# Patient Record
Sex: FEMALE | Race: WHITE | NOT HISPANIC OR LATINO | Employment: FULL TIME | URBAN - METROPOLITAN AREA
[De-identification: names, ages, dates, MRNs, and addresses within clinical notes are randomized per-mention and may not be internally consistent; named-entity substitution may affect disease eponyms.]

---

## 2018-05-15 ENCOUNTER — CONVERSION ENCOUNTER (OUTPATIENT)
Dept: MAMMOGRAPHY | Facility: HOSPITAL | Age: 38
End: 2018-05-15

## 2019-10-10 ENCOUNTER — HOSPITAL ENCOUNTER (OUTPATIENT)
Dept: PERIOP | Facility: HOSPITAL | Age: 39
Setting detail: HOSPITAL OUTPATIENT SURGERY
Discharge: HOME | End: 2019-10-10
Attending: OBSTETRICS & GYNECOLOGY

## 2019-10-10 LAB
ABO GROUP BLD: NORMAL
BASOPHILS # BLD AUTO: 0.03 10*3/UL (ref 0–0.2)
BASOPHILS NFR BLD AUTO: 0.5 % (ref 0–3)
BLD GP AB SCN SERPL QL: NORMAL
CONV ABD CONTROL: NORMAL
CONV ABS IMM GRAN: 0.01 10*3/UL (ref 0–0.2)
CONV IMMATURE GRAN: 0.2 % (ref 0–1.8)
DEPRECATED RDW RBC AUTO: 43.4 FL (ref 36.4–46.3)
EOSINOPHIL # BLD AUTO: 0.17 10*3/UL (ref 0–0.7)
EOSINOPHIL # BLD AUTO: 3 % (ref 0–7)
ERYTHROCYTE [DISTWIDTH] IN BLOOD BY AUTOMATED COUNT: 12.6 % (ref 11.7–14.4)
HCG UR QL: NEGATIVE
HCT VFR BLD AUTO: 41.8 % (ref 37–47)
HGB BLD-MCNC: 13.8 G/DL (ref 12–16)
LYMPHOCYTES # BLD AUTO: 2.45 10*3/UL (ref 1–5)
LYMPHOCYTES NFR BLD AUTO: 42.9 % (ref 20–45)
MCH RBC QN AUTO: 30.6 PG (ref 27–31)
MCHC RBC AUTO-ENTMCNC: 33 G/DL (ref 33–37)
MCV RBC AUTO: 92.7 FL (ref 81–99)
MONOCYTES # BLD AUTO: 0.85 10*3/UL (ref 0.2–1.2)
MONOCYTES NFR BLD AUTO: 14.9 % (ref 3–10)
NEUTROPHILS # BLD AUTO: 2.2 10*3/UL (ref 2–8)
NEUTROPHILS NFR BLD AUTO: 38.5 % (ref 30–85)
NRBC CBCN: 0 % (ref 0–0.7)
PLATELET # BLD AUTO: 214 10*3/UL (ref 130–400)
PMV BLD AUTO: 11.2 FL (ref 9.4–12.3)
RBC # BLD AUTO: 4.51 10*6/UL (ref 4.2–5.4)
RH BLD: NORMAL
WBC # BLD AUTO: 5.71 10*3/UL (ref 4.8–10.8)

## 2021-03-05 ENCOUNTER — OFFICE VISIT CONVERTED (OUTPATIENT)
Dept: SURGERY | Facility: CLINIC | Age: 41
End: 2021-03-05
Attending: SURGERY

## 2021-03-05 ENCOUNTER — HOSPITAL ENCOUNTER (OUTPATIENT)
Dept: PREADMISSION TESTING | Facility: HOSPITAL | Age: 41
Discharge: HOME | End: 2021-03-05
Attending: SURGERY

## 2021-03-06 LAB — SARS-COV-2 RNA SPEC QL NAA+PROBE: NOT DETECTED

## 2021-03-09 ENCOUNTER — HOSPITAL ENCOUNTER (OUTPATIENT)
Dept: PERIOP | Facility: HOSPITAL | Age: 41
Setting detail: HOSPITAL OUTPATIENT SURGERY
Discharge: HOME | End: 2021-03-09
Attending: SURGERY

## 2021-03-19 ENCOUNTER — OFFICE VISIT CONVERTED (OUTPATIENT)
Dept: SURGERY | Facility: CLINIC | Age: 41
End: 2021-03-19
Attending: SURGERY

## 2021-03-19 ENCOUNTER — CONVERSION ENCOUNTER (OUTPATIENT)
Dept: SURGERY | Facility: CLINIC | Age: 41
End: 2021-03-19

## 2021-04-09 ENCOUNTER — HOSPITAL ENCOUNTER (OUTPATIENT)
Dept: PREADMISSION TESTING | Facility: HOSPITAL | Age: 41
Discharge: HOME | End: 2021-04-09
Attending: SURGERY

## 2021-04-09 LAB — SARS-COV-2 RNA SPEC QL NAA+PROBE: NOT DETECTED

## 2021-04-14 ENCOUNTER — HOSPITAL ENCOUNTER (OUTPATIENT)
Dept: GASTROENTEROLOGY | Facility: HOSPITAL | Age: 41
Setting detail: HOSPITAL OUTPATIENT SURGERY
Discharge: HOME | End: 2021-04-14
Attending: SURGERY

## 2021-05-10 NOTE — H&P
History and Physical      Patient Name: Jeimy Bautista   Patient ID: [id number]   Sex: Female   YOB: 1980    Referring Provider: Tristian Cantu MD    Visit Date: 2021    Provider: Regan Longoria MD   Location: [facility]   Location Address: [address] [place] [address]  [id number]   Location Phone: [phone]          Chief Complaint  · Outpatient History & Physical / Surgical Orders  · Gallbladder Consult      History Of Present Illness  Jeimy Bautista is a 40 year old /White female who presents to the office today as a consult from Tristian Cantu MD.      atient was referred to me for consideration for gallbladder removal.  The patient has right upper quadrant pain that occurs after eating, especially after eating fatty and greasy foods.  The symptoms have been occurring on and off for the past 10 years and have been worsening quite a bit over the past year.  She was evaluated with an abdominal ultrasound at [facility] on 2020 and findings were unremarkable except for the presence of gallbladder sludge.  Also, the patient says she has been having lots of GERD and reflux.  She occasionally takes ibuprofen for shoulder arthritis.  She says Dr. Cantu was hoping an EGD could be done at the same time her gallbladder is removed if I decide to recommend gallbladder removal.  Patient takes Protonix 40 mg 2 times daily.  Surgical history includes  x3 with transversely oriented incisions and also laparoscopic tubal ligation.       Past Medical History  Disease Name Date Onset Notes   Allergic rhinitis, chronic --  --          Past Surgical History  Procedure Name Date Notes   *Metal Implant --  --    Hernia --  --          Medication List  Name Date Started Instructions   clonazepam 1 mg oral tablet  take 1 tablet (1 mg) by oral route 2 times per day   Depakote 500 mg oral tablet,delayed release (DR/EC)  take 2  "tablets (1,000 mg) by oral route 2 times per day   Flonase Allergy Relief 50 mcg/actuation nasal spray,suspension  spray 2 sprays (100 mcg) in each nostril by intranasal route once daily as needed   Protonix 40 mg oral tablet,delayed release (DR/EC)  take 2 tablets (80 mg) by oral route 2 times per day   trazodone 150 mg oral tablet  take 1 tablet (150 mg) by oral route 2 times per day         Allergy List  Allergen Name Date Reaction Notes   NO KNOWN DRUG ALLERGIES --  --  --        Allergies Reconciled  Family Medical History  Disease Name Relative/Age Notes   Diabetes, unspecified type Mother/   Mother   Family history of breast cancer Grandmother (paternal)/60s   Aunt/60s; Grandmother (paternal)/60s   Bladder calculus Mother/   Mother         Review of Systems  · Constitutional  o Denies  o : chills, fever  · Gastrointestinal  o Denies  o : vomiting      Vitals  Date Time BP Position Site L\R Cuff Size HR RR TEMP (F) WT  HT  BMI kg/m2 BSA m2 O2 Sat FR L/min FiO2        03/05/2021 01:49 PM       12  210lbs 4oz 5'  2\" 38.45 2.04             Physical Examination  · Constitutional  o Appearance  o : healthy appearing, alert and in no acute distress, reliable historian, here alone  · Head and Face  o Head  o :   § Inspection  § : no visable deformities or lesions  · Eyes  o Conjunctivae  o : clear  o Sclerae  o : clear  · Neck  o Inspection/Palpation  o : normal appearance, no masses, trachea midline  · Respiratory  o Respiratory Effort  o : breathing unlabored, respiratory effort appears normal  o Inspection of Chest  o : normal appearance, no retractions  · Cardiovascular  o Heart  o : regular rate and rhythm  · Gastrointestinal  o Abdominal Examination  o :   § Abdomen  § : soft, nontender, nondistended  · Skin and Subcutaneous Tissue  o General Inspection  o : no visible concerning rashes or lesions present  · Neurologic  o Cranial Nerves  o : no obvious motor deficits  o Sensation  o : no obvious sensory " deficits  o Gait and Station  o :   § Gait Screening  § : normal gait, able to stand without diffculty  o Cerebellar Function  o : no obvious abnormalities  · Psychiatric  o Judgement and Insight  o : judgment and insight intact  o Mood and Affect  o : mood normal, affect appropriate          Assessment  · Pre-Surgical Orders     V72.84  · Abdominal Pain, RUQ     789.01/R10.11  · Preop testing     V72.84/Z01.818  · GERD (gastroesophageal reflux disease)     530.81/K21.9  · Heartburn     787.1/R12  · Gallbladder sludge     575.8/K82.8      Plan  · Orders  o GENERAL SURGERY (GNSUR) - V72.84 - 03/09/2021  o [facility] [place]Op Covid-19 Screening (43377) - V72.84/Z01.818 - 03/05/2021  · Medications  o Medications have been Reconciled  o Transition of Care or Provider Policy  · Instructions  o PLAN: Laparoscopic Cholecystectomy with Intraoperative Cholangiogram and EGD  o PLEASE SIGN PERMIT FOR: Laparoscopic Cholecystectomy with Intraoperative Cholangiogram and esophagogastroduodenoscopy  o Anesthesia: General   o Outpatient  o O.R. PREP: Per protocol  o IV: Per Anesthesia  o SCD's preoperatively  o No antibiotic is needed.  o The indications, options, risks, benefits, and expected outcomes of the planned procedure were discussed with the patient and the patient agrees to proceed.   o Electronically Identified Patient Education Materials Provided Electronically            Electronically Signed by: Regan Lnogoria MD -Author on March 5, 2021 02:02:59 PM

## 2021-05-14 VITALS — BODY MASS INDEX: 38.69 KG/M2 | HEIGHT: 62 IN | RESPIRATION RATE: 12 BRPM | WEIGHT: 210.25 LBS

## 2021-05-14 VITALS — RESPIRATION RATE: 14 BRPM | WEIGHT: 216 LBS | HEIGHT: 62 IN | BODY MASS INDEX: 39.75 KG/M2

## 2021-05-14 NOTE — PROGRESS NOTES
"   Progress Note      Patient Name: Jeimy Bautista   Patient ID: [id number]   Sex: Female   YOB: 1980    Primary Care Provider: Tristian Cantu MD   Referring Provider: Tristian Cantu MD    Visit Date: March 19, 2021    Provider: Regan Longoria MD   Location: [facility]   Location Address: [address] [place] [address]  [id number]   Location Phone: [phone]          Chief Complaint  · Follow up Surgery      History Of Present Illness  Jeimy Bautista is a 40 year old /White female who presents today for a postoperative visit.      Patient is here for follow-up after gallbladder removal.  She is doing great.  No complaints.  Incisions are healing well.  Abdominal exam is benign.  No new issues to address.  Patient can see me as needed       Past Medical History  Allergic rhinitis, chronic         Past Surgical History  *Metal Implant; Cholecystectomy; Hernia         Medication List  clonazepam 1 mg oral tablet; Depakote 500 mg oral tablet,delayed release (DR/EC); Flonase Allergy Relief 50 mcg/actuation nasal spray,suspension; Protonix 40 mg oral tablet,delayed release (DR/EC); trazodone 150 mg oral tablet         Allergy List  NO KNOWN DRUG ALLERGIES         Family Medical History  Diabetes, unspecified type; Family history of breast cancer; Bladder calculus         Social History  Tobacco (Former)         Review of Systems  · Gastrointestinal  o Denies  o : nausea, vomiting, diarrhea, constipation, flank pain      Vitals  Date Time BP Position Site L\R Cuff Size HR RR TEMP (F) WT  HT  BMI kg/m2 BSA m2 O2 Sat FR L/min FiO2 HC       03/19/2021 03:07 PM       14  216lbs 0oz 5'  2\" 39.51 2.07                 Assessment  · Encounter for examination following surgery     V67.00/Z09      Plan  · Medications  o Medications have been Reconciled  o Transition of Care or Provider Policy  · Instructions  o See Above HPI section.  o Electronically Identified Patient " Education Materials Provided Electronically            Electronically Signed by: Regan Longoria MD -Author on March 19, 2021 03:18:59 PM

## 2022-04-28 ENCOUNTER — TRANSCRIBE ORDERS (OUTPATIENT)
Dept: ADMINISTRATIVE | Facility: HOSPITAL | Age: 42
End: 2022-04-28

## 2022-04-28 DIAGNOSIS — R01.1 MURMUR: ICD-10-CM

## 2022-04-28 DIAGNOSIS — R00.2 PALPITATIONS: Primary | ICD-10-CM

## 2022-05-24 ENCOUNTER — HOSPITAL ENCOUNTER (OUTPATIENT)
Dept: CARDIOLOGY | Facility: HOSPITAL | Age: 42
Discharge: HOME | End: 2022-05-24

## 2022-05-24 DIAGNOSIS — R01.1 MURMUR: ICD-10-CM

## 2022-05-24 DIAGNOSIS — R00.2 PALPITATIONS: ICD-10-CM

## 2022-05-24 LAB
BH CV ECHO MEAS - AO ROOT DIAM: 2.1 CM
BH CV ECHO MEAS - EF(MOD-BP): 55 %
BH CV ECHO MEAS - IVSD: 0.8 CM
BH CV ECHO MEAS - LA A2CS (ATRIAL LENGTH): 2.7 CM
BH CV ECHO MEAS - LAT PEAK E' VEL: 11 CM/SEC
BH CV ECHO MEAS - LVIDD: 4.7 CM
BH CV ECHO MEAS - LVIDS: 3 CM
BH CV ECHO MEAS - LVPWD: 0.7 CM
BH CV ECHO MEAS - MED PEAK E' VEL: 11 CM/SEC
BH CV ECHO MEAS - MV A MAX VEL: 60 CM/SEC
BH CV ECHO MEAS - MV DEC TIME: 152 MSEC
BH CV ECHO MEAS - MV E MAX VEL: 70 CM/SEC
BH CV ECHO MEAS - MV E/A: 1.2
BH CV ECHO MEASUREMENTS AVERAGE E/E' RATIO: 6.36
IVRT: 69 MSEC
LEFT ATRIUM VOLUME INDEX: 13 ML/M2
MAXIMAL PREDICTED HEART RATE: 180 BPM
STRESS TARGET HR: 153 BPM

## 2022-05-24 PROCEDURE — 93225 XTRNL ECG REC<48 HRS REC: CPT

## 2022-05-24 PROCEDURE — 93306 TTE W/DOPPLER COMPLETE: CPT

## 2022-06-07 LAB
MAXIMAL PREDICTED HEART RATE: 180 BPM
STRESS TARGET HR: 153 BPM